# Patient Record
Sex: MALE | Race: WHITE | NOT HISPANIC OR LATINO
[De-identification: names, ages, dates, MRNs, and addresses within clinical notes are randomized per-mention and may not be internally consistent; named-entity substitution may affect disease eponyms.]

---
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
## 2019-04-10 ENCOUNTER — RX RENEWAL (OUTPATIENT)
Age: 19
End: 2019-04-10

## 2019-04-17 ENCOUNTER — APPOINTMENT (OUTPATIENT)
Dept: PEDIATRIC ENDOCRINOLOGY | Facility: CLINIC | Age: 19
End: 2019-04-17

## 2019-04-17 VITALS
DIASTOLIC BLOOD PRESSURE: 66 MMHG | HEART RATE: 90 BPM | BODY MASS INDEX: 22.08 KG/M2 | WEIGHT: 143.98 LBS | SYSTOLIC BLOOD PRESSURE: 104 MMHG | HEIGHT: 67.72 IN

## 2019-04-17 LAB
BILIRUB UR QL STRIP: NEGATIVE
CLARITY UR: CLEAR
COLLECTION METHOD: NORMAL
GLUCOSE BLDC GLUCOMTR-MCNC: 143
GLUCOSE UR-MCNC: NEGATIVE
HBA1C MFR BLD HPLC: 7.9
HCG UR QL: NORMAL EU/DL
HGB UR QL STRIP.AUTO: NEGATIVE
KETONES UR-MCNC: NEGATIVE
LEUKOCYTE ESTERASE UR QL STRIP: NEGATIVE
NITRITE UR QL STRIP: NEGATIVE
PH UR STRIP: 5.5
PROT UR STRIP-MCNC: NEGATIVE
SP GR UR STRIP: 1.03

## 2019-04-17 NOTE — REVIEW OF SYSTEMS
[Nl] : Neurological [Wgt Gain (___ Lbs)] : recent [unfilled] lb weight gain [Headache] : no headache [Constipation] : no constipation [Dizziness] : no dizziness

## 2019-04-17 NOTE — CONSULT LETTER
[Dear  ___] : Dear  [unfilled], [Courtesy Letter:] : I had the pleasure of seeing your patient, [unfilled], in my office today. [Please see my note below.] : Please see my note below. [Consult Closing:] : Thank you very much for allowing me to participate in the care of this patient.  If you have any questions, please do not hesitate to contact me. [Sincerely,] : Sincerely, [FreeTextEntry3] : Judit Delarosa MD\par Pediatric Endocrinology\par [facility]\par [facility]\par

## 2019-04-17 NOTE — DISCUSSION/SUMMARY
[FreeTextEntry1] : Saad has T1DM in good control, though worsening from prior.  He is overeating without insulin at night out of fear he will go low.  As a result he is running high all night.  He is to continue current doses withouth this extra uncovered food, and to come back in for us to review CGM in 7-10d.  He is reassured that he is on the Dexcom G6 which will alert him if he is going low.  We have also agreed that if he is less than 150 at bedtime he may have 15-20g without covering, however if eats more than that needs to cover.  Additionally if he is exercising late at night he should have an extra 15-20g uncovered snack after returning from exercise (already takes a snack before exercise).\par \par Saad has long-standing thyroid autoimmunity, thus far euthyroid.  There is no goiter and no symptoms of hypothyroidism.  He is due for annual bloodwork, which will include TFTs.

## 2019-04-17 NOTE — DATA REVIEWED
[FreeTextEntry1] : Labs 10/2/17: \par HbA1C 17.4%  Gluc 1059mg/dL  Insulin 2.6uU/ml CPeptide 0.3ng/mL UA ket 15\par pH 7.373 HCO3 24\par FT4 1.18 TSH 3.62 AntiTG 47.6 inc [id number] inc\par Celiac pending, insulin Abs pending, antiGAD pending\par

## 2019-04-17 NOTE — PHYSICAL EXAM
[Healthy Appearing] : healthy appearing [Interactive] : interactive [Normal Appearance] : normal appearance [WNL for age] : within normal limits of age [None] : there were no thyroid nodules [Normal S1 and S2] : normal S1 and S2 [Clear to Ausculation Bilaterally] : clear to auscultation bilaterally [Abdomen Soft] : soft [Abdomen Tenderness] : non-tender [] : no hepatosplenomegaly [Normal] : normal  [Goiter] : no goiter [de-identified] : thin, well hydrated [Murmur] : no murmurs [de-identified] : no lipodystrophy

## 2019-04-17 NOTE — HISTORY OF PRESENT ILLNESS
[Other: ___] :  blood sugar levels are tested [unfilled] times per day [Legs] : legs [Abdomen] : abdomen [Glucagon at Home] : has glucagon at home [Previous Hypoglycemic Seizure] : has no history of hypoglycemic seizure [FreeTextEntry2] : Saad is a 19y M here for follow-up of T1DM.  Diagnosed with T1DM 10/2017. Last seen 8/2018.  Has been at [facility] in [place] doing well.  Will be back for the summer.  He has been active, doing a lot of sports.  Says has delayed hypoglycemia after exercise, testing regularly and bolusing regularly.  Good energy, appetite good, no recent illness, no ER visits.  He has Dexcom G6.  He notes he overeats at night without insulin because feels that if he will not he will become hypoglycemic - this has especially been an issue in the last week while home for passover break as he is playing basketball from 10p-12mn daily, and by morning will be 60 if doesn't make himself high before going to sleep.  Does not know how much he is eating.  On review of dexcom CGM downlowd he goes high by 7pm (though says covers dinner fully), stays there overnight and dips at 7am.\par \par Follow-up of thyroid autoimmunity. No goiter, no fatigue.  No cold intolerance, no constipation, no dry skin.\par \par DIabetes RN:\par Patient's Hemoglobin A1C increased from 7.1% to 7.9% from prior appointment in August 2018. FSBS was retrieved manually from his glucometer (OneTouch) and electronically for his Dexcom that he just started using since April 12th, 2019. Exercises daily for 1-2 hours by playing basketball. No recent reports of hospitlizations were reported by patient. Saad had been performing finger sticks roughly about 3-5x daily before he was on his Dexcom G6, rotating sides of fingertips and sites for insulin administration (legs and abdomen). Is unaware of his annual follow-up appointments with his ophthalmologist, dentist, or nutritionist, recommended that he should follow-up. Although there were signs of lipohypertrophy when examined by Dr. Kat, we suggested to avoid these sites as insulin absorption diminishes. No reports of hypoglycemia, re-education for Glucagon administration and 15:15 rule provided in case of S&S of hypoglycemia were to occur.  Frequent reports of hyperglycemia in the middle of the night was reported, patient worried and is eating without covering carbohydrates before sleep in order to avoid S&S of hypoglycemia, hence his increase in Hemoglobin A1C. No S&S of hyperglycemia were reported. Re-education for Ketone testing was provided when days of illness was to occur. Saad is aware of his meal bolus insulin regimen along with his ordered evening Lantus dose before sleep. Additional labs were ordered by Dr. Kat to complete for Monday, April 29th, 2019, informed him that fasting is necessary. Acquiring more data is necessary on the day of bloodwork, suggested to continue with FSBS with One Touch along with Dexcom G6 CGM. Will continue to F/U as necessary.\par \par \par Ophtho - 4/2018\par Dental - >1y\par Vaccines - no pneumovax yet\par CDE visit 7/2018 A1C 7.9%\par Nutrition 3/2018\par Annual labs 3/2018 - normal

## 2019-04-17 NOTE — PAST MEDICAL HISTORY
[At Term] : at term [Normal Vaginal Route] : by normal vaginal route [None] : there were no delivery complications [Age Appropriate] : age appropriate developmental milestones met [FreeTextEntry1] : 6lb2oz

## 2019-04-17 NOTE — THERAPY
[Today's Date] : [unfilled] [Novolog] : Novolog [___] : [unfilled] units of insulin pre-bedtime [Carbohydrate Ratio:                  1 unit for every ___ grams of carbohydrates] : Carbohydrate Ratio: 1 unit for every [unfilled] grams of carbohydrates [BG Target = ____] : BG Target = [unfilled] [Insulin Sensitivity Factor = ____] : Insulin Sensitivity Factor = [unfilled]

## 2019-05-01 LAB
25(OH)D3 SERPL-MCNC: 17.6 NG/ML
ALBUMIN SERPL ELPH-MCNC: 4.9 G/DL
ALP BLD-CCNC: 122 U/L
ALT SERPL-CCNC: 17 U/L
ANION GAP SERPL CALC-SCNC: 10 MMOL/L
AST SERPL-CCNC: 19 U/L
BASOPHILS # BLD AUTO: 0.01 K/UL
BASOPHILS NFR BLD AUTO: 0.3 %
BILIRUB SERPL-MCNC: 0.6 MG/DL
BUN SERPL-MCNC: 22 MG/DL
CALCIUM SERPL-MCNC: 9.8 MG/DL
CHLORIDE SERPL-SCNC: 102 MMOL/L
CHOLEST SERPL-MCNC: 217 MG/DL
CHOLEST/HDLC SERPL: 3.7 RATIO
CO2 SERPL-SCNC: 27 MMOL/L
CREAT SERPL-MCNC: 0.96 MG/DL
EOSINOPHIL # BLD AUTO: 0.04 K/UL
EOSINOPHIL NFR BLD AUTO: 1 %
ESTIMATED AVERAGE GLUCOSE: 174 MG/DL
GLUCOSE SERPL-MCNC: 128 MG/DL
HBA1C MFR BLD HPLC: 7.7 %
HCT VFR BLD CALC: 44.4 %
HDLC SERPL-MCNC: 58 MG/DL
HGB BLD-MCNC: 13.9 G/DL
IGA SER QL IEP: 105 MG/DL
IMM GRANULOCYTES NFR BLD AUTO: 0.3 %
LDLC SERPL CALC-MCNC: 150 MG/DL
LYMPHOCYTES # BLD AUTO: 1.52 K/UL
LYMPHOCYTES NFR BLD AUTO: 38.7 %
MAN DIFF?: NORMAL
MCHC RBC-ENTMCNC: 28.7 PG
MCHC RBC-ENTMCNC: 31.3 GM/DL
MCV RBC AUTO: 91.7 FL
MONOCYTES # BLD AUTO: 0.32 K/UL
MONOCYTES NFR BLD AUTO: 8.1 %
NEUTROPHILS # BLD AUTO: 2.03 K/UL
NEUTROPHILS NFR BLD AUTO: 51.6 %
PLATELET # BLD AUTO: 235 K/UL
POTASSIUM SERPL-SCNC: 4.6 MMOL/L
PROT SERPL-MCNC: 7.4 G/DL
RBC # BLD: 4.84 M/UL
RBC # FLD: 12.4 %
SODIUM SERPL-SCNC: 139 MMOL/L
T4 FREE SERPL-MCNC: 1.5 NG/DL
TRIGL SERPL-MCNC: 47 MG/DL
TSH SERPL-ACNC: 2.61 UIU/ML
TTG IGA SER IA-ACNC: <1.2 U/ML
TTG IGA SER-ACNC: NEGATIVE
WBC # FLD AUTO: 3.93 K/UL

## 2019-07-01 ENCOUNTER — RX RENEWAL (OUTPATIENT)
Age: 19
End: 2019-07-01

## 2019-07-12 ENCOUNTER — RX RENEWAL (OUTPATIENT)
Age: 19
End: 2019-07-12

## 2019-07-22 ENCOUNTER — APPOINTMENT (OUTPATIENT)
Dept: PEDIATRIC ENDOCRINOLOGY | Facility: CLINIC | Age: 19
End: 2019-07-22
Payer: COMMERCIAL

## 2019-07-22 VITALS
BODY MASS INDEX: 22.15 KG/M2 | DIASTOLIC BLOOD PRESSURE: 75 MMHG | SYSTOLIC BLOOD PRESSURE: 118 MMHG | HEIGHT: 67.72 IN | HEART RATE: 89 BPM | WEIGHT: 144.5 LBS

## 2019-07-22 DIAGNOSIS — E06.3 AUTOIMMUNE THYROIDITIS: ICD-10-CM

## 2019-07-22 PROCEDURE — 81002 URINALYSIS NONAUTO W/O SCOPE: CPT

## 2019-07-22 PROCEDURE — 36416 COLLJ CAPILLARY BLOOD SPEC: CPT

## 2019-07-22 PROCEDURE — 82962 GLUCOSE BLOOD TEST: CPT

## 2019-07-22 PROCEDURE — 83036 HEMOGLOBIN GLYCOSYLATED A1C: CPT | Mod: QW

## 2019-07-22 PROCEDURE — 95251 CONT GLUC MNTR ANALYSIS I&R: CPT | Mod: 59

## 2019-07-22 PROCEDURE — 99215 OFFICE O/P EST HI 40 MIN: CPT | Mod: 25

## 2019-07-22 RX ORDER — INSULIN ASPART 100 [IU]/ML
100 INJECTION, SOLUTION INTRAVENOUS; SUBCUTANEOUS
Qty: 45 | Refills: 1 | Status: DISCONTINUED | COMMUNITY
End: 2019-07-22

## 2019-07-22 RX ORDER — LANCETS 33 GAUGE
EACH MISCELLANEOUS
Qty: 600 | Refills: 3 | Status: ACTIVE | COMMUNITY
Start: 2017-10-18 | End: 1900-01-01

## 2019-07-22 RX ORDER — NEOMYCIN/POLYMYXIN B/PRAMOXINE 3.5-10K-1
CREAM (GRAM) TOPICAL
Qty: 100 | Refills: 6 | Status: ACTIVE | COMMUNITY
Start: 2019-07-22

## 2019-07-22 RX ORDER — BLOOD SUGAR DIAGNOSTIC
STRIP MISCELLANEOUS
Qty: 900 | Refills: 3 | Status: ACTIVE | COMMUNITY
Start: 2017-10-18 | End: 1900-01-01

## 2019-07-22 RX ORDER — CHROMIUM 200 MCG
1000 TABLET ORAL DAILY
Qty: 60 | Refills: 3 | Status: ACTIVE | COMMUNITY

## 2019-07-22 RX ORDER — GLUCAGON 1 MG
1 KIT INJECTION
Qty: 3 | Refills: 1 | Status: ACTIVE | COMMUNITY
Start: 2017-10-18 | End: 1900-01-01

## 2019-07-22 RX ORDER — ISOPROPYL ALCOHOL 0.7 ML/ML
SWAB TOPICAL
Qty: 600 | Refills: 3 | Status: DISCONTINUED | COMMUNITY
Start: 2017-10-18 | End: 2019-07-22

## 2019-07-22 NOTE — CONSULT LETTER
[Dear  ___] : Dear  [unfilled], [Courtesy Letter:] : I had the pleasure of seeing your patient, [unfilled], in my office today. [Please see my note below.] : Please see my note below. [Consult Closing:] : Thank you very much for allowing me to participate in the care of this patient.  If you have any questions, please do not hesitate to contact me. [Sincerely,] : Sincerely, [FreeTextEntry3] : Judit Delarosa MD\par Pediatric Endocrinology\par [facility]\par [facility]\par

## 2019-07-22 NOTE — DISCUSSION/SUMMARY
[FreeTextEntry1] : Saad has T1DM in excellent control, improved from prior.  He is no longer chasing lows.  At his request we will try to increase Lantus to 15u as he is waking up slighly higher than prior.  He is to upload CGM to share with us in 2 weeks.  We will then assess if the Lantus dose is appropriate, and also reassess evening peaks and whether increase in I:C is indicated.  \par \par Saad has long-standing thyroid autoimmunity, thus far euthyroid.  There is no goiter and no symptoms of hypothyroidism.  \par \par On recent labs Saad had elevated LDL, which was normal 1 year ago.  We would like to give him an opportunity to manage first with diet alone.  His diet since being at [facility] is certainly suboptimal, and has not been significantly altered since these results.  I have urged him to lower his cholesterol/fat intake.  Additionally we discussed things to improve cholesterol such as oatmeal and fish.  He may also try adding an omega 3 FA daily OTC supplement.  We will reassess labs next visit.\par \par Finally Saad has limited dairy intake with resultant vitamin D deficiency.  He has been taking vitamin D supplement over the last 3 months and is increasing his sun exposure.  We have urged increasing dairy intake.

## 2019-07-22 NOTE — ASSESSMENT
[FreeTextEntry1] : Dental\par Ophtho\par Nutrition referral - Low fat/low cholesterol diet\par Review DSs in 2-3 weeks, see effect of Lantus, possibly increase I:C\par Fasting lipids next visit\par Transition of care discussed - to identify one before next visit

## 2019-07-22 NOTE — HISTORY OF PRESENT ILLNESS
[5] :  blood sugar levels are tested 5 times per day [Abdomen] : abdomen [Glucagon at Home] : has glucagon at home [Legs] : legs [Previous Hypoglycemic Seizure] : has no history of hypoglycemic seizure [FreeTextEntry2] : Saad is a 19y9m M here for follow-up of T1DM.  Diagnosed with T1DM 10/2017. Last seen 8/2018.  Has been at [facility] in [place], in the last month in [place], to be going to [place] for the next year with his [facility].  He has been active, playing basketball daily.  Very little hypoglycemia after exercise, taking extra carbs for exericse.  Testing regularly and bolusing regularly.  Good energy, appetite good, no recent illness, no ER visits.  He has Dexcom G6.   \par \par Follow-up of thyroid autoimmunity. No goiter, no fatigue.  No cold intolerance, no constipation, no dry skin.\par \par DIabetes RN:\par Patient's Hemoglobin A1C decreased from 7.9% to 7.1%.  No recent reports of hospitlizations were reported by patient. \par FSBS was retrieved manually from his CGM\par Exercise: daily playing basketball. \par Hypoglycemia: Reports 1 episode hypoglycemia, explained by prolonged time without eating. Reviewed treatment of hypoglycemia.  \par Hyperglycemia: corrects as necessary. Reviewed testing for ketones.  \par Insulin: Lantus daily no omission, Humalog every time he eats and for correction as necessary.\par Physician review of data: On review of dexcom CGM download, very steady in first half of day, some peaks evening/early night after dinner of night snack, comes down slowly by middle of night.  Few nights where no peak around midnight, slight rise by am.\par \par Ophtho - 4/2018 - scheduled this week\par Dental - >1y, scheduled this week\par Vaccines - got pneumovax ~10/2018\par CDE visit 7/2018 \par Nutrition 3/2018\par Annual labs 4/2019 - elevated LDL, nl TFTs\par \par Hypercholesterolemia: last labs increased LDL, recommended dietary change: hard to control as in dormitory style living, limited improvements made -  trying to eat smaller portion of red meat ~3x/wk, scramble eggs 1x/wk, fried food 1-2x/wk, fish rare, 1% milk\par \par Vitamin D deficiency: took vitamin D gummies 2000IU qd, now getting sun exposure, milk few times a week\par \par Insulin doses:\par I:C 1:10\par T 120\par SF 50\par Lantus 14u

## 2019-07-22 NOTE — PHYSICAL EXAM
[Healthy Appearing] : healthy appearing [Interactive] : interactive [Normal Appearance] : normal appearance [WNL for age] : within normal limits of age [None] : there were no thyroid nodules [Normal S1 and S2] : normal S1 and S2 [Clear to Ausculation Bilaterally] : clear to auscultation bilaterally [Abdomen Soft] : soft [Abdomen Tenderness] : non-tender [] : no hepatosplenomegaly [Goiter] : no goiter [Murmur] : no murmurs [Normal] : grossly intact [de-identified] : thin, well hydrated [de-identified] : no lipodystrophy

## 2019-07-22 NOTE — REVIEW OF SYSTEMS
[Nl] : Neurological [Wgt Gain (___ Lbs)] : recent [unfilled] lb weight gain [Constipation] : no constipation [Headache] : no headache [Dizziness] : no dizziness

## 2019-07-26 LAB
BILIRUB UR QL STRIP: NEGATIVE
CLARITY UR: CLEAR
COLLECTION METHOD: NORMAL
GLUCOSE BLDC GLUCOMTR-MCNC: 133
GLUCOSE UR-MCNC: NEGATIVE
HBA1C MFR BLD HPLC: 7.1
HCG UR QL: 0.2 EU/DL
HGB UR QL STRIP.AUTO: NORMAL
KETONES UR-MCNC: 15
LEUKOCYTE ESTERASE UR QL STRIP: NORMAL
NITRITE UR QL STRIP: NORMAL
PH UR STRIP: 5.5
PROT UR STRIP-MCNC: NEGATIVE
SP GR UR STRIP: 1.02

## 2019-08-18 ENCOUNTER — RX RENEWAL (OUTPATIENT)
Age: 19
End: 2019-08-18

## 2019-08-22 ENCOUNTER — RX CHANGE (OUTPATIENT)
Age: 19
End: 2019-08-22

## 2019-08-22 RX ORDER — BLOOD-GLUCOSE SENSOR
EACH MISCELLANEOUS
Qty: 6 | Refills: 3 | Status: ACTIVE | COMMUNITY
Start: 2018-07-19 | End: 1900-01-01

## 2019-08-27 ENCOUNTER — OUTPATIENT (OUTPATIENT)
Dept: OUTPATIENT SERVICES | Facility: HOSPITAL | Age: 19
LOS: 1 days | Discharge: HOME | End: 2019-08-27

## 2019-08-27 ENCOUNTER — OTHER (OUTPATIENT)
Age: 19
End: 2019-08-27

## 2019-10-16 ENCOUNTER — APPOINTMENT (OUTPATIENT)
Dept: PEDIATRIC ENDOCRINOLOGY | Facility: CLINIC | Age: 19
End: 2019-10-16
Payer: COMMERCIAL

## 2019-10-16 VITALS
WEIGHT: 139 LBS | HEIGHT: 67.13 IN | SYSTOLIC BLOOD PRESSURE: 104 MMHG | HEART RATE: 82 BPM | BODY MASS INDEX: 21.56 KG/M2 | DIASTOLIC BLOOD PRESSURE: 69 MMHG

## 2019-10-16 DIAGNOSIS — E78.00 PURE HYPERCHOLESTEROLEMIA, UNSPECIFIED: ICD-10-CM

## 2019-10-16 DIAGNOSIS — E55.9 VITAMIN D DEFICIENCY, UNSPECIFIED: ICD-10-CM

## 2019-10-16 LAB
BILIRUB UR QL STRIP: NEGATIVE
CLARITY UR: CLEAR
COLLECTION METHOD: NORMAL
GLUCOSE BLDC GLUCOMTR-MCNC: 90
GLUCOSE UR-MCNC: NEGATIVE
HBA1C MFR BLD HPLC: 6.8
HCG UR QL: 0.2 EU/DL
HGB UR QL STRIP.AUTO: NEGATIVE
KETONES UR-MCNC: NEGATIVE
LEUKOCYTE ESTERASE UR QL STRIP: NEGATIVE
NITRITE UR QL STRIP: NEGATIVE
PH UR STRIP: 6.5
PROT UR STRIP-MCNC: NEGATIVE
SP GR UR STRIP: 1.01

## 2019-10-16 PROCEDURE — 83036 HEMOGLOBIN GLYCOSYLATED A1C: CPT | Mod: QW,59

## 2019-10-16 PROCEDURE — 95251 CONT GLUC MNTR ANALYSIS I&R: CPT | Mod: 59

## 2019-10-16 PROCEDURE — 81003 URINALYSIS AUTO W/O SCOPE: CPT | Mod: QW

## 2019-10-16 PROCEDURE — 82962 GLUCOSE BLOOD TEST: CPT

## 2019-10-16 PROCEDURE — 99215 OFFICE O/P EST HI 40 MIN: CPT | Mod: 25

## 2019-10-16 PROCEDURE — 36416 COLLJ CAPILLARY BLOOD SPEC: CPT

## 2019-10-16 RX ORDER — INSULIN GLARGINE 100 [IU]/ML
100 INJECTION, SOLUTION SUBCUTANEOUS AT BEDTIME
Qty: 2 | Refills: 3 | Status: ACTIVE | COMMUNITY
Start: 1900-01-01 | End: 1900-01-01

## 2019-10-16 NOTE — HISTORY OF PRESENT ILLNESS
[5] :  blood sugar levels are tested 5 times per day [Legs] : legs [Abdomen] : abdomen [Glucagon at Home] : has glucagon at home [Previous Hypoglycemic Seizure] : has no history of hypoglycemic seizure [FreeTextEntry2] : Saad is a 19y6m M here for follow-up of T1DM.  Diagnosed with T1DM 10/2017.  Has been at [facility] in [place].  He has been active, playing basketball daily - less during recent holidays.  Less hypoglycemia, no longer after exercise. Testing regularly and bolusing regularly.  Good energy, appetite good.  He has Dexcom G6.  \par \par Follow-up of thyroid autoimmunity. No goiter, no fatigue.  No cold intolerance, no constipation, no dry skin.\par \par Hypercholesterolemia: last labs increased LDL, recommended dietary change, saw nutritionist, decreased meat intake, hard to control as in dormitory style living, eggs 1x/wk, fried food 2x/wk, fish rare, 1% milk\par \par Vitamin D deficiency: took vitamin D gummies 2000IU qd prior, now getting sun exposure, milk daily\par \par Reports a few weeks ago was having episodes of heart pounding and L chest sharp pain brief bursts, increased burping, heartburn - went to ER, prescribed omeprazole,  then increased after few days to higher dose, taking daily, improved, but then over the holidays did not watch diet and worsened.  No heart racing, no shortness of breath, no heat intolerance.\par \par DIabetes RN:\par Patient's Hemoglobin A1C decreased from 7.1% to 6.8%.  Recent report of x1 ER visit while away for school approximately early October 2019, C/O heartburn, abdominal discomfort symptoms. Treated with Zantac and additional medications that patient doesn't recall, and D/C'ed.  Continues to have complaints.\par \par Dexcom G6 CGM downloaded\par Exercise: playing basketball daily.\par Hypoglycemia: Multiple reports of hypoglycemia at night after correcting evening highs, 4% in 14-day span. Reviewed treatment of hypoglycemia. \par Hyperglycemia: Frequently high over holiday's averaging at 179mg/dL within a 14-day span, reports of highs from 200 to < 350mg/dL. Corrects as necessary. Reviewed testing for ketones.  \par Insulin: Lantus daily with no reports of omission. Boluses with Humalog every time he eats and corrects as necessary. Reviewed therapy regimen, and is able to repeat verbally with ease.\par \par Physician review of data: On review of dexcom CGM download, occasional slight dip middle of night, peaks last few days associated with holiday feasts, bolusing mid-meal. Mostly runs high 100s.\par \par Ophtho - Summer 2019\par Dental - Summer 2019\par Vaccines - Flu vaccine planned for next month\par Nutrition visit August 2019 concerning Hyperlipidemia - has made changes\par Annual labs 4/2019 - elevated LDL, low 25OHvitD\par \par \par \par Insulin doses:\par I:C 1:10\par T 120\par SF 50\par Lantus 15u

## 2019-10-16 NOTE — REVIEW OF SYSTEMS
[Nl] : Neurological [Abdominal Pain] : abdominal pain [Constipation] : constipation [Diaphoresis] : not diaphoretic [Chest Pain] : chest pain  [Vomiting] : no vomiting [Headache] : no headache [Dizziness] : no dizziness [Cold Intolerance] : cold tolerant [Heat Intolerance] : heat tolerant

## 2019-10-16 NOTE — PHYSICAL EXAM
[Healthy Appearing] : healthy appearing [Interactive] : interactive [Normal Appearance] : normal appearance [WNL for age] : within normal limits of age [None] : there were no thyroid nodules [Normal S1 and S2] : normal S1 and S2 [Clear to Ausculation Bilaterally] : clear to auscultation bilaterally [Abdomen Soft] : soft [Abdomen Tenderness] : non-tender [] : no hepatosplenomegaly [Normal] : normal  [Goiter] : no goiter [Murmur] : no murmurs [de-identified] : thin, well hydrated [de-identified] : no lipodystrophy

## 2019-10-16 NOTE — ASSESSMENT
[FreeTextEntry1] : Transition of care discussed - to schedule adult endo for 20st birthday, to be done prior to next visit so may smoothly transition care

## 2019-10-16 NOTE — DISCUSSION/SUMMARY
[FreeTextEntry1] : Saad has T1DM in excellent control, improved from prior. To further tighten control we will try to increase Lantus to 16u and lower bedtime/nighttime I:C and SF.  Also recommended bolusing at beginning of meal.\par \par Saad has long-standing thyroid autoimmunity, thus far euthyroid.  There is no goiter and no symptoms of hypothyroidism.  We will reassess at this time.\par \par On last labs Saad had elevated LDL, normal prior. He has seen nutritionist for guidance, but changes have been limited by living in dormitory setting.  We will reassess at this time.  \par \par Saad has limited dairy intake with resultant vitamin D deficiency.  He took vitamin D supplement prior but not currently.  We will reassess at this time.\par \par Finally, Saad is recently complaining of symptoms suggestive of GEReflux.  Recommended following up with PCP and if need be GI.

## 2019-10-16 NOTE — THERAPY
[Today's Date] : [unfilled] [Humalog] : Humalog [___] : [unfilled] units of insulin pre-bedtime [Carbohydrate Ratio:                  1 unit for every ___ grams of carbohydrates] : Carbohydrate Ratio: 1 unit for every [unfilled] grams of carbohydrates [BG Target = ____] : BG Target = [unfilled] [Insulin Sensitivity Factor = ____] : Insulin Sensitivity Factor = [unfilled] [FreeTextEntry3] : Bedtime/overnight I:C 15 [FreeTextEntry2] : Bedtime/overnight SF 75

## 2019-10-16 NOTE — CONSULT LETTER
[Dear  ___] : Dear  [unfilled], [Courtesy Letter:] : I had the pleasure of seeing your patient, [unfilled], in my office today. [Please see my note below.] : Please see my note below. [Consult Closing:] : Thank you very much for allowing me to participate in the care of this patient.  If you have any questions, please do not hesitate to contact me. [Sincerely,] : Sincerely, [FreeTextEntry3] : Judit Delarosa MD\par Pediatric Endocrinology\par [facility]\par [facility]\par

## 2019-10-17 LAB
25(OH)D3 SERPL-MCNC: 26.2 NG/ML
ALBUMIN SERPL ELPH-MCNC: 5.2 G/DL
ALP BLD-CCNC: 96 U/L
ALT SERPL-CCNC: 12 U/L
ANION GAP SERPL CALC-SCNC: 14 MMOL/L
AST SERPL-CCNC: 17 U/L
BASOPHILS # BLD AUTO: 0.01 K/UL
BASOPHILS NFR BLD AUTO: 0.1 %
BILIRUB SERPL-MCNC: 0.4 MG/DL
BUN SERPL-MCNC: 20 MG/DL
CALCIUM SERPL-MCNC: 10 MG/DL
CHLORIDE SERPL-SCNC: 101 MMOL/L
CHOLEST SERPL-MCNC: 193 MG/DL
CHOLEST/HDLC SERPL: 3.2 RATIO
CK SERPL-CCNC: 174 U/L
CO2 SERPL-SCNC: 26 MMOL/L
CREAT SERPL-MCNC: 0.86 MG/DL
CREAT SPEC-SCNC: 112 MG/DL
EOSINOPHIL # BLD AUTO: 0.07 K/UL
EOSINOPHIL NFR BLD AUTO: 1 %
ESTIMATED AVERAGE GLUCOSE: 146 MG/DL
GLUCOSE SERPL-MCNC: 103 MG/DL
HBA1C MFR BLD HPLC: 6.7 %
HCT VFR BLD CALC: 42.4 %
HDLC SERPL-MCNC: 61 MG/DL
HGB BLD-MCNC: 13.6 G/DL
IGA SER QL IEP: 130 MG/DL
IMM GRANULOCYTES NFR BLD AUTO: 0.1 %
LDLC SERPL CALC-MCNC: 122 MG/DL
LYMPHOCYTES # BLD AUTO: 1.39 K/UL
LYMPHOCYTES NFR BLD AUTO: 19.5 %
MAN DIFF?: NORMAL
MCHC RBC-ENTMCNC: 29 PG
MCHC RBC-ENTMCNC: 32.1 GM/DL
MCV RBC AUTO: 90.4 FL
MICROALBUMIN 24H UR DL<=1MG/L-MCNC: <1.2 MG/DL
MICROALBUMIN/CREAT 24H UR-RTO: NORMAL MG/G
MONOCYTES # BLD AUTO: 0.5 K/UL
MONOCYTES NFR BLD AUTO: 7 %
NEUTROPHILS # BLD AUTO: 5.15 K/UL
NEUTROPHILS NFR BLD AUTO: 72.3 %
PLATELET # BLD AUTO: 226 K/UL
POTASSIUM SERPL-SCNC: 3.9 MMOL/L
PROT SERPL-MCNC: 7.7 G/DL
RBC # BLD: 4.69 M/UL
RBC # FLD: 12.4 %
SODIUM SERPL-SCNC: 141 MMOL/L
T4 SERPL-MCNC: 9.9 UG/DL
TRIGL SERPL-MCNC: 50 MG/DL
TSH SERPL-ACNC: 3.69 UIU/ML
WBC # FLD AUTO: 7.13 K/UL

## 2019-10-21 LAB
TTG IGA SER IA-ACNC: <1.2 U/ML
TTG IGA SER-ACNC: NEGATIVE

## 2019-12-27 ENCOUNTER — RX RENEWAL (OUTPATIENT)
Age: 19
End: 2019-12-27

## 2020-01-21 ENCOUNTER — RX RENEWAL (OUTPATIENT)
Age: 20
End: 2020-01-21

## 2020-01-21 RX ORDER — 70%ISOPROPYL ALCOHOL 0.7 ML/ML
70 SWAB TOPICAL
Qty: 600 | Refills: 3 | Status: ACTIVE | COMMUNITY
Start: 2018-08-13 | End: 1900-01-01

## 2020-01-27 ENCOUNTER — APPOINTMENT (OUTPATIENT)
Dept: PEDIATRIC ENDOCRINOLOGY | Facility: CLINIC | Age: 20
End: 2020-01-27

## 2020-03-20 RX ORDER — URINE ACETONE TEST STRIPS
STRIP MISCELLANEOUS
Qty: 300 | Refills: 3 | Status: ACTIVE | COMMUNITY
Start: 2017-10-18 | End: 1900-01-01

## 2020-04-06 ENCOUNTER — APPOINTMENT (OUTPATIENT)
Dept: PEDIATRIC ENDOCRINOLOGY | Facility: CLINIC | Age: 20
End: 2020-04-06
Payer: COMMERCIAL

## 2020-04-06 DIAGNOSIS — E10.65 TYPE 1 DIABETES MELLITUS WITH HYPERGLYCEMIA: ICD-10-CM

## 2020-04-06 DIAGNOSIS — R76.8 OTHER SPECIFIED ABNORMAL IMMUNOLOGICAL FINDINGS IN SERUM: ICD-10-CM

## 2020-04-06 PROCEDURE — 99443: CPT

## 2020-04-06 PROCEDURE — 95251 CONT GLUC MNTR ANALYSIS I&R: CPT | Mod: 59

## 2020-05-15 RX ORDER — INSULIN LISPRO 100 [IU]/ML
100 INJECTION, SOLUTION INTRAVENOUS; SUBCUTANEOUS
Qty: 4 | Refills: 2 | Status: DISCONTINUED | COMMUNITY
Start: 2019-04-17 | End: 2020-05-15

## 2020-05-18 ENCOUNTER — RX RENEWAL (OUTPATIENT)
Age: 20
End: 2020-05-18

## 2020-05-18 RX ORDER — BLOOD-GLUCOSE METER
31G X 5 MM EACH MISCELLANEOUS
Qty: 200 | Refills: 11 | Status: ACTIVE | COMMUNITY
Start: 2020-05-18 | End: 1900-01-01

## 2020-05-18 RX ORDER — PEN NEEDLE, DIABETIC 32GX 5/32"
70 NEEDLE, DISPOSABLE MISCELLANEOUS
Qty: 200 | Refills: 11 | Status: ACTIVE | COMMUNITY
Start: 2020-05-18 | End: 1900-01-01

## 2020-06-12 ENCOUNTER — RX RENEWAL (OUTPATIENT)
Age: 20
End: 2020-06-12

## 2020-06-15 ENCOUNTER — RX RENEWAL (OUTPATIENT)
Age: 20
End: 2020-06-15

## 2020-06-15 RX ORDER — BLOOD-GLUCOSE,RECEIVER,CONT
EACH MISCELLANEOUS
Qty: 1 | Refills: 3 | Status: ACTIVE | COMMUNITY
Start: 2018-07-19 | End: 1900-01-01

## 2020-06-18 RX ORDER — INSULIN ASPART 100 [IU]/ML
100 INJECTION, SOLUTION INTRAVENOUS; SUBCUTANEOUS
Qty: 4 | Refills: 2 | Status: ACTIVE | COMMUNITY
Start: 2020-05-15 | End: 1900-01-01

## 2020-07-02 RX ORDER — PEN NEEDLE, DIABETIC 29 G X1/2"
31G X 5 MM NEEDLE, DISPOSABLE MISCELLANEOUS
Qty: 200 | Refills: 3 | Status: ACTIVE | COMMUNITY
Start: 2017-10-18 | End: 1900-01-01

## 2020-07-02 RX ORDER — BLOOD-GLUCOSE TRANSMITTER
EACH MISCELLANEOUS
Qty: 1 | Refills: 3 | Status: ACTIVE | COMMUNITY
Start: 2018-07-19 | End: 1900-01-01

## 2020-09-29 ENCOUNTER — RX RENEWAL (OUTPATIENT)
Age: 20
End: 2020-09-29

## 2021-04-07 ENCOUNTER — RX RENEWAL (OUTPATIENT)
Age: 21
End: 2021-04-07

## 2024-09-12 ENCOUNTER — APPOINTMENT (OUTPATIENT)
Dept: HUMAN REPRODUCTION | Facility: CLINIC | Age: 24
End: 2024-09-12

## 2024-09-23 ENCOUNTER — APPOINTMENT (OUTPATIENT)
Dept: HUMAN REPRODUCTION | Facility: CLINIC | Age: 24
End: 2024-09-23